# Patient Record
Sex: MALE | Race: WHITE | HISPANIC OR LATINO | Employment: UNEMPLOYED | URBAN - METROPOLITAN AREA
[De-identification: names, ages, dates, MRNs, and addresses within clinical notes are randomized per-mention and may not be internally consistent; named-entity substitution may affect disease eponyms.]

---

## 2018-09-25 ENCOUNTER — HOSPITAL ENCOUNTER (EMERGENCY)
Facility: MEDICAL CENTER | Age: 28
End: 2018-09-25
Attending: EMERGENCY MEDICINE
Payer: COMMERCIAL

## 2018-09-25 VITALS
WEIGHT: 315 LBS | BODY MASS INDEX: 41.75 KG/M2 | DIASTOLIC BLOOD PRESSURE: 80 MMHG | SYSTOLIC BLOOD PRESSURE: 140 MMHG | HEART RATE: 64 BPM | HEIGHT: 73 IN | RESPIRATION RATE: 14 BRPM | OXYGEN SATURATION: 98 % | TEMPERATURE: 98.2 F

## 2018-09-25 VITALS
TEMPERATURE: 97.6 F | HEIGHT: 73 IN | HEART RATE: 61 BPM | DIASTOLIC BLOOD PRESSURE: 79 MMHG | SYSTOLIC BLOOD PRESSURE: 138 MMHG | RESPIRATION RATE: 17 BRPM | BODY MASS INDEX: 41.75 KG/M2 | OXYGEN SATURATION: 95 % | WEIGHT: 315 LBS

## 2018-09-25 DIAGNOSIS — M54.31 SCIATICA OF RIGHT SIDE: ICD-10-CM

## 2018-09-25 DIAGNOSIS — R11.10 NON-INTRACTABLE VOMITING, PRESENCE OF NAUSEA NOT SPECIFIED, UNSPECIFIED VOMITING TYPE: ICD-10-CM

## 2018-09-25 PROCEDURE — 99284 EMERGENCY DEPT VISIT MOD MDM: CPT

## 2018-09-25 PROCEDURE — 96372 THER/PROPH/DIAG INJ SC/IM: CPT

## 2018-09-25 PROCEDURE — 700111 HCHG RX REV CODE 636 W/ 250 OVERRIDE (IP): Performed by: EMERGENCY MEDICINE

## 2018-09-25 RX ORDER — ONDANSETRON 4 MG/1
4 TABLET, ORALLY DISINTEGRATING ORAL ONCE
Status: COMPLETED | OUTPATIENT
Start: 2018-09-25 | End: 2018-09-25

## 2018-09-25 RX ORDER — ESOMEPRAZOLE MAGNESIUM 40 MG/1
40 GRANULE, DELAYED RELEASE ORAL
COMMUNITY
End: 2018-09-26

## 2018-09-25 RX ORDER — CYCLOBENZAPRINE HCL 10 MG
10 TABLET ORAL 3 TIMES DAILY PRN
Qty: 30 TAB | Refills: 0 | Status: SHIPPED | OUTPATIENT
Start: 2018-09-25

## 2018-09-25 RX ORDER — HYDROMORPHONE HYDROCHLORIDE 2 MG/ML
2 INJECTION, SOLUTION INTRAMUSCULAR; INTRAVENOUS; SUBCUTANEOUS ONCE
Status: COMPLETED | OUTPATIENT
Start: 2018-09-25 | End: 2018-09-25

## 2018-09-25 RX ORDER — METHYLPREDNISOLONE 4 MG/1
TABLET ORAL
Qty: 1 KIT | Refills: 0 | Status: SHIPPED | OUTPATIENT
Start: 2018-09-25 | End: 2018-09-30

## 2018-09-25 RX ORDER — KETOROLAC TROMETHAMINE 30 MG/ML
30 INJECTION, SOLUTION INTRAMUSCULAR; INTRAVENOUS ONCE
Status: COMPLETED | OUTPATIENT
Start: 2018-09-25 | End: 2018-09-25

## 2018-09-25 RX ADMIN — ONDANSETRON 4 MG: 4 TABLET, ORALLY DISINTEGRATING ORAL at 16:29

## 2018-09-25 RX ADMIN — ONDANSETRON 4 MG: 4 TABLET, ORALLY DISINTEGRATING ORAL at 17:12

## 2018-09-25 RX ADMIN — KETOROLAC TROMETHAMINE 30 MG: 30 INJECTION, SOLUTION INTRAMUSCULAR; INTRAVENOUS at 14:16

## 2018-09-25 RX ADMIN — HYDROMORPHONE HYDROCHLORIDE 2 MG: 2 INJECTION INTRAMUSCULAR; INTRAVENOUS; SUBCUTANEOUS at 14:16

## 2018-09-25 ASSESSMENT — PAIN SCALES - GENERAL
PAINLEVEL_OUTOF10: 3
PAINLEVEL_OUTOF10: 8
PAINLEVEL_OUTOF10: 8
PAINLEVEL_OUTOF10: 3

## 2018-09-25 ASSESSMENT — PAIN DESCRIPTION - DESCRIPTORS: DESCRIPTORS: SHOOTING

## 2018-09-25 NOTE — ED TRIAGE NOTES
"Zohaib Reich  Chief Complaint   Patient presents with   • Vomiting   • Dizziness     Pt w/c to triage with above complaint. Pt was discharged from ED at approx 1500. Pt states he was given dilaudid and Toradol IM injections prior to discharge. Pt now c/o N/V and feeling dizzy. Pt stating \"I just want this to go away. I can't be out there like this.\"    /87   Pulse 65   Temp 36.4 °C (97.6 °F) (Temporal)   Resp 16   Ht 1.854 m (6' 1\")   Wt (!) 142.9 kg (315 lb 0.6 oz)   SpO2 95%   BMI 41.56 kg/m²     Pt informed of triage process and encouraged to notify staff of any changes or concerns. Pt verbalized understanding of instructions. Pt placed back in lobby.     "

## 2018-09-25 NOTE — ED TRIAGE NOTES
"Zohaib Ernesto Damir  Chief Complaint   Patient presents with   • Low Back Pain     Pt ambulatory to triage with above complaint. Pt denies radiation of pain. Pt denies any trauma or falls. Pt denies difficulty or painful urination.    /94   Pulse 68   Temp 36.7 °C (98 °F)   Resp 12   Ht 1.854 m (6' 1\")   Wt (!) 143.9 kg (317 lb 3.9 oz)   SpO2 98%   BMI 41.86 kg/m²     Pt informed of triage process and encouraged to notify staff of any changes or concerns. Pt verbalized understanding of instructions. Pt placed back in lobby.     "

## 2018-09-25 NOTE — ED NOTES
Discharge instructions and prescriptions provided. Pt verbalized understanding. Pt vital signs stable.  Questions and concerns addressed. Patient instructed to not drive or operate vehicles. Patient ambulated steadily out of department without difficulty in care of family

## 2018-09-25 NOTE — ED PROVIDER NOTES
ED Provider Note    CHIEF COMPLAINT  Chief Complaint   Patient presents with   • Vomiting   • Dizziness       HPI  Zohaib Reich is a 27 y.o. male who presents for evaluation after recent visit at this facility today. I saw the patient earlier today. Symptoms consistent with sciatica. He is given intramuscular Dilaudid Toradol and sent home on a Medrol Dosepak and Flexeril. He was doing quite fine after an observation period of about an hour we wheeled him out to check out then he became quite nauseous. We brought him back in. He did not report any new symptoms other than nausea queasiness and dizziness. No chest pain or shortness of breath    REVIEW OF SYSTEMS  See HPI for further details.  No reported Fevers chills night sweats weight loss All other systems are negative.     PAST MEDICAL HISTORY  Past Medical History:   Diagnosis Date   • GERD (gastroesophageal reflux disease)        FAMILY HISTORY  Noncontributory    SOCIAL HISTORY  Social History     Social History   • Marital status: Single     Spouse name: N/A   • Number of children: N/A   • Years of education: N/A     Social History Main Topics   • Smoking status: Never Smoker   • Smokeless tobacco: Never Used   • Alcohol use No   • Drug use: Yes     Types: Inhaled      Comment: marijuana   • Sexual activity: Not on file     Other Topics Concern   • Not on file     Social History Narrative   • No narrative on file       SURGICAL HISTORY  No past surgical history on file.    CURRENT MEDICATIONS  Home Medications     Reviewed by Isha Yen R.N. (Registered Nurse) on 09/25/18 at 1603  Med List Status: Complete   Medication Last Dose Status   cyclobenzaprine (FLEXERIL) 10 MG Tab  Active   esomeprazole magnesium (NEXIUM) 40 MG Pack 9/3/2018 Active   MethylPREDNISolone (MEDROL DOSEPAK) 4 MG Tablet Therapy Pack  Active                ALLERGIES  No Known Allergies    PHYSICAL EXAM  VITAL SIGNS: /87   Pulse 65   Temp 36.4 °C (97.6 °F) (Temporal)  "  Resp 16   Ht 1.854 m (6' 1\")   Wt (!) 142.9 kg (315 lb 0.6 oz)   SpO2 95%   BMI 41.56 kg/m²       Constitutional:Patient appears nauseous uncomfortable actively vomiting HENT: Normocephalic, Atraumatic, Bilateral external ears normal, Oropharynx moist, No oral exudates, Nose normal.   Eyes: PERRLA, EOMI, Conjunctiva normal, No discharge.   Neck: Normal range of motion, No tenderness, Supple, No stridor.   Cardiovascular: Normal heart rate, Normal rhythm, No murmurs, No rubs, No gallops.   Thorax & Lungs: Normal breath sounds, No respiratory distress, No wheezing, No chest tenderness.   Abdomen: Bowel sounds normal, Soft, No tenderness, No masses, No pulsatile masses.   Skin: Warm, Dry, No erythema, No rash.   Back: No tenderness, No CVA tenderness.   Extremities: Intact distal pulses, No edema, No tenderness, No cyanosis, No clubbing.   Neurologic: Alert & oriented x 3, Normal motor function, Normal sensory function, No focal deficits noted.       COURSE & MEDICAL DECISION MAKING  Pertinent Labs & Imaging studies reviewed. (See chart for details)  I suspect the patient has opioid-induced nausea and vomiting. Breath patient back if him a sublingual Zofran patient was observed for several hours of acute liquids down. He feels much better after 2nd dose of Zofran    FINAL IMPRESSION  1.   1. Non-intractable vomiting, presence of nausea not specified, unspecified vomiting type             Electronically signed by: Jag Jefferson, 9/25/2018 4:39 PM    "

## 2018-09-25 NOTE — ED PROVIDER NOTES
"ED Provider Note    CHIEF COMPLAINT  Chief Complaint   Patient presents with   • Low Back Pain       HPI  Zohaib Reich is a 27 y.o. male who presents For evaluation of several days of worsening low back pain and right sided with radiation to the buttock but not down the leg. He denies any distinct trauma specifically no blunt or penetrating trauma. He cannot distinctly recall any injury pattern. She denies any IV drug use no incontinence no numbness weakness or tingling. Patient reports worsening pain with leg extension no incontinence. He denies IV drug use. No history of back surgery    REVIEW OF SYSTEMS  See HPI for further details. No incontinence numbness tingling weakness flank pain or hematuria All other systems are negative.     PAST MEDICAL HISTORY  Past Medical History:   Diagnosis Date   • GERD (gastroesophageal reflux disease)        FAMILY HISTORY  Noncontributory    SOCIAL HISTORY  Social History     Social History   • Marital status: N/A     Spouse name: N/A   • Number of children: N/A   • Years of education: N/A     Social History Main Topics   • Smoking status: Never Smoker   • Smokeless tobacco: Never Used   • Alcohol use No   • Drug use: Yes     Types: Inhaled      Comment: marijuana   • Sexual activity: Not on file     Other Topics Concern   • Not on file     Social History Narrative   • No narrative on file   No IV drugs    SURGICAL HISTORY  No past surgical history on file.    CURRENT MEDICATIONS  Home Medications     Reviewed by Isha Yen R.N. (Registered Nurse) on 09/25/18 at 1337  Med List Status: Partial   Medication Last Dose Status   esomeprazole magnesium (NEXIUM) 40 MG Pack 9/3/2018 Active                ALLERGIES  No Known Allergies    PHYSICAL EXAM  VITAL SIGNS: /94   Pulse 68   Temp 36.7 °C (98 °F)   Resp 12   Ht 1.854 m (6' 1\")   Wt (!) 143.9 kg (317 lb 3.9 oz)   SpO2 98%   BMI 41.86 kg/m²  Room air O2: 98    Constitutional:Patient appears to be " uncomfortable   HENT: Normocephalic, Atraumatic, Bilateral external ears normal, Oropharynx moist, No oral exudates, Nose normal.   Eyes: PERRLA, EOMI, Conjunctiva normal, No discharge.   Neck: Normal range of motion, No tenderness, Supple, No stridor.   Cardiovascular: Normal heart rate, Normal rhythm, No murmurs, No rubs, No gallops.   Thorax & Lungs: Normal breath sounds, No respiratory distress, No wheezing, No chest tenderness.   Abdomen: Bowel sounds normal, Soft, No tenderness, No masses, No pulsatile masses.   Skin: Warm, Dry, No erythema, No rash.   Back: No midline bony thoracolumbar tenderness. Significant paraspinal spasm on the right side discomfort noted over the sciatic foramen on the buttock. Positive straight leg test on the right. Current sensation is normal  Genitalia: Groin sensation is normal   Extremities: Intact distal pulses, No edema, No tenderness, No cyanosis, No clubbing.   Neurologic: Alert & oriented x 3, Normal motor function, Normal sensory function, No focal deficits noted. Bilateral patellar DTRs intact positive straight leg test on the right  Psychiatric: Anxious         COURSE & MEDICAL DECISION MAKING  Pertinent Labs & Imaging studies reviewed. (See chart for details)  The patient presented with classic history and physical exam to suggest right-sided sciatica. He has no hard neurological deficits no fever no history of trauma prior back surgery or history of IV drug use. The patient was given intramuscular Toradol and Dilaudid. I'll discharge him home on a Medrol Dosepak and Flexeril. I counseled him to avoid heavy lifting or any other issues or movements that will exacerbate his symptoms    FINAL IMPRESSION  1. Acute right-sided sciatica      Electronically signed by: Jag Jefferson, 9/25/2018 1:58 PM

## 2018-09-26 ENCOUNTER — HOSPITAL ENCOUNTER (EMERGENCY)
Facility: MEDICAL CENTER | Age: 28
End: 2018-09-26
Attending: EMERGENCY MEDICINE
Payer: COMMERCIAL

## 2018-09-26 VITALS
BODY MASS INDEX: 41.71 KG/M2 | RESPIRATION RATE: 16 BRPM | OXYGEN SATURATION: 95 % | WEIGHT: 315 LBS | DIASTOLIC BLOOD PRESSURE: 86 MMHG | SYSTOLIC BLOOD PRESSURE: 129 MMHG | TEMPERATURE: 97.5 F | HEART RATE: 60 BPM

## 2018-09-26 DIAGNOSIS — M54.31 SCIATICA OF RIGHT SIDE: ICD-10-CM

## 2018-09-26 DIAGNOSIS — Z76.0 MEDICATION REFILL: ICD-10-CM

## 2018-09-26 LAB
APPEARANCE UR: CLEAR
BILIRUB UR QL STRIP.AUTO: NEGATIVE
COLOR UR: YELLOW
GLUCOSE UR STRIP.AUTO-MCNC: NEGATIVE MG/DL
KETONES UR STRIP.AUTO-MCNC: NEGATIVE MG/DL
LEUKOCYTE ESTERASE UR QL STRIP.AUTO: NEGATIVE
MICRO URNS: NORMAL
NITRITE UR QL STRIP.AUTO: NEGATIVE
PH UR STRIP.AUTO: 6 [PH]
PROT UR QL STRIP: NEGATIVE MG/DL
RBC UR QL AUTO: NEGATIVE
SP GR UR STRIP.AUTO: 1.01
UROBILINOGEN UR STRIP.AUTO-MCNC: 0.2 MG/DL

## 2018-09-26 PROCEDURE — 700111 HCHG RX REV CODE 636 W/ 250 OVERRIDE (IP): Performed by: EMERGENCY MEDICINE

## 2018-09-26 PROCEDURE — 700102 HCHG RX REV CODE 250 W/ 637 OVERRIDE(OP): Performed by: EMERGENCY MEDICINE

## 2018-09-26 PROCEDURE — 99284 EMERGENCY DEPT VISIT MOD MDM: CPT

## 2018-09-26 PROCEDURE — 96372 THER/PROPH/DIAG INJ SC/IM: CPT

## 2018-09-26 PROCEDURE — 81003 URINALYSIS AUTO W/O SCOPE: CPT

## 2018-09-26 PROCEDURE — A9270 NON-COVERED ITEM OR SERVICE: HCPCS | Performed by: EMERGENCY MEDICINE

## 2018-09-26 RX ORDER — ESOMEPRAZOLE MAGNESIUM 40 MG/1
40 GRANULE, DELAYED RELEASE ORAL
Qty: 30 EACH | Refills: 0 | Status: SHIPPED | OUTPATIENT
Start: 2018-09-26 | End: 2018-09-30

## 2018-09-26 RX ORDER — ONDANSETRON 4 MG/1
4 TABLET, ORALLY DISINTEGRATING ORAL ONCE
Status: COMPLETED | OUTPATIENT
Start: 2018-09-26 | End: 2018-09-26

## 2018-09-26 RX ORDER — OXYCODONE HYDROCHLORIDE AND ACETAMINOPHEN 5; 325 MG/1; MG/1
2 TABLET ORAL ONCE
Status: COMPLETED | OUTPATIENT
Start: 2018-09-26 | End: 2018-09-26

## 2018-09-26 RX ORDER — KETOROLAC TROMETHAMINE 30 MG/ML
30 INJECTION, SOLUTION INTRAMUSCULAR; INTRAVENOUS ONCE
Status: COMPLETED | OUTPATIENT
Start: 2018-09-26 | End: 2018-09-26

## 2018-09-26 RX ORDER — OXYCODONE HYDROCHLORIDE AND ACETAMINOPHEN 5; 325 MG/1; MG/1
1-2 TABLET ORAL EVERY 6 HOURS PRN
Qty: 12 TAB | Refills: 0 | Status: SHIPPED | OUTPATIENT
Start: 2018-09-26 | End: 2018-09-29

## 2018-09-26 RX ADMIN — ONDANSETRON 4 MG: 4 TABLET, ORALLY DISINTEGRATING ORAL at 12:51

## 2018-09-26 RX ADMIN — KETOROLAC TROMETHAMINE 30 MG: 30 INJECTION, SOLUTION INTRAMUSCULAR at 12:54

## 2018-09-26 RX ADMIN — OXYCODONE HYDROCHLORIDE AND ACETAMINOPHEN 2 TABLET: 5; 325 TABLET ORAL at 12:51

## 2018-09-26 ASSESSMENT — PAIN SCALES - GENERAL: PAINLEVEL_OUTOF10: 9

## 2018-09-26 NOTE — ED TRIAGE NOTES
PT to triage c/o low back pain x 2 days.  PT was seen yesterday for the same.  Chief Complaint   Patient presents with   • Low Back Pain     Blood pressure 135/81, pulse 67, temperature 36.4 °C (97.5 °F), resp. rate 16, weight (!) 143.4 kg (316 lb 2.2 oz), SpO2 94 %.

## 2018-09-26 NOTE — ED NOTES
"Patient walked back into [facility], asking for food.  Patient stated he went to the cafeteria and states that \"we would provide him with a voucher for food.\"  Patient requesting voucher or box lunch.  ERP and this RN informed that patient that we only have crackers in this pod.  Patient complaining that he saw other people with boxed lunches and states that he does not care but he is hungry.  Patient left [facility] again.    "

## 2018-09-26 NOTE — DISCHARGE INSTRUCTIONS
Continue the medications were prescribed.  Return to the emergency part for more pain or any new symptoms or concerns.  Follow-up with [facility] doctor or the [facility].

## 2018-09-26 NOTE — ED PROVIDER NOTES
ED Provider Note  CHIEF COMPLAINT  Chief Complaint   Patient presents with   • Low Back Pain       HPI  Zohaib Reich is a 27 y.o. male who presents to the emergency department complaining of low back pain.  The patient has low back pain on the right that his back has had this for the last several days.  He has a long history of back pain, he states the more chronic upper thoracic back pain as a complication of scoliosis.  This is been present for several years.  Recently has become homeless and has been carrying a backpack around and he feels this is causing of some back pain.  The pain is in the lower right side of his back.  Radiates down his leg and up his thigh.  No nausea no vomiting no abdominal pain no hematuria.    He has not sustained any falls or direct trauma.  He has no incontinence of bowel or bladder.  No saddle anesthesia.  Denies any numbness tingling or weakness in the legs other than difficulty standing up because of pain.  Denies any fevers chills, recent febrile illness, or injection drug abuse.  Was recently for back pain.  Was prescribed Medrol Dosepak and Flexeril.  Has little relief.  Denies any other acute concerns or complaints.    REVIEW OF SYSTEMS  See HPI for further details. All other systems are negative.    PAST MEDICAL HISTORY  Past Medical History:   Diagnosis Date   • GERD (gastroesophageal reflux disease)        FAMILY HISTORY  History reviewed. No pertinent family history.    SOCIAL HISTORY  Social History     Social History   • Marital status: Single     Spouse name: N/A   • Number of children: N/A   • Years of education: N/A     Social History Main Topics   • Smoking status: Never Smoker   • Smokeless tobacco: Never Used   • Alcohol use No   • Drug use: Yes     Types: Inhaled      Comment: marijuana   • Sexual activity: Not on file     Other Topics Concern   • Not on file     Social History Narrative   • No narrative on file       SURGICAL HISTORY  History reviewed. No  pertinent surgical history.    CURRENT MEDICATIONS  Home Medications     Reviewed by Erica Michaud R.N. (Registered Nurse) on 09/26/18 at 1215  Med List Status: Complete   Medication Last Dose Status   cyclobenzaprine (FLEXERIL) 10 MG Tab 9/26/2018 Active   esomeprazole magnesium (NEXIUM) 40 MG Pack 9/3/2018 Active   MethylPREDNISolone (MEDROL DOSEPAK) 4 MG Tablet Therapy Pack 9/26/2018 Active                ALLERGIES  No Known Allergies    PHYSICAL EXAM  VITAL SIGNS: /81   Pulse 67   Temp 36.4 °C (97.5 °F)   Resp 16   Wt (!) 143.4 kg (316 lb 2.2 oz)   SpO2 94%   BMI 41.71 kg/m²    Constitutional: Well developed, Well nourished, No acute distress, Non-toxic appearance.   HENT: Normocephalic, Atraumatic,  Eyes: PERRL, EOMI, Conjunctiva normal, No discharge.   Neck: Normal range of motion, No tenderness, Supple, No stridor.   Lymphatic: No lymphadenopathy noted.   Cardiovascular: Normal heart rate, Normal rhythm, No murmurs, No rubs, No gallops.   Thorax & Lungs: Normal breath sounds, No respiratory distress, No wheezing,  Abdomen: Bowel sounds normal, Soft, No tenderness,  Skin: Warm, Dry, No erythema, No rash.   Back: No midline thoracic or lumbar tenderness.  He has paraspinal muscle spasm and tenderness in the right side this is palpable.  He has tenderness palpation of muscles on the right side per  Musculoskeletal: Good range of motion in all major joints. No tenderness to palpation or major deformities noted.  Pulses in both feet are strong and symmetric per   neurologic: Alert & oriented x 3, Normal motor function, Normal sensory function, No focal deficits noted.  Measure DTR at the ankle and patella bilaterally.  Normal great toe raise dorsiflexion plantarflexion of the feet.  Normal sensation  Psychiatric: Affect normal            COURSE & MEDICAL DECISION MAKING  Pertinent Labs & Imaging studies reviewed. (See chart for details)  The patient presents to the emergency department with low  back pain.    Broad differential diagnosis was considered.  He was recently here, the chart from that visit was reviewed.  No red flags to suggest significant pathology.    At this point the patient presents with low back pain. The patient's physical exam is essentially benign. There is no evidence of cord impingement. No evidence of cauda equina. Clinically and historically there is no concern for epidural abscess, or epidural hematoma. There is no history of recent trauma. Patient has had these symptoms previously. At this time I feel that the most appropriate treatment for what is apparently mechanical low back pain with his pain control. I will prescribe Percocet for pain.        In prescribing controlled substances to this patient, I certify that I have obtained and reviewed the medical history of Zohaib Reich. I have also made a good abdulaziz effort to obtain applicable records from other providers who have treated the patient and records did not demonstrate any increased risk of substance abuse that would prevent me from prescribing controlled substances.     I have conducted a physical exam and documented it. I have reviewed Mr. Reich’s prescription history as maintained by the [place] Prescription Monitoring Program.     I have assessed the patient’s risk for abuse, dependency, and addiction using the validated Opioid Risk Tool available at https://www.mdcalc.com/rtozac-zmvj-uiht-ort-narcotic-abuse.     Given the above, I believe the benefits of controlled substance therapy outweigh the risks. The reasons for prescribing controlled substances include non-narcotic, oral analgesic alternatives have been inadequate for pain control. Accordingly, I have discussed the risk and benefits, treatment plan, and alternative therapies with the patient      The patient felt much better after some pain medicine.  Is a bit around the ER asking for food.  He looks well he is a normal neurologic exam there is no red flags  to suggest he requires imaging or workup at this time.  He is given referral for outpatient workup.  His blood pressure be rechecked.  He is prescribed Percocet to add to his additional regimen.  He is given appropriate return precautions questions are answered is agreeable with the plan.    Subsequent the patient is looking for a refill of his Nexium.  I gave a prescription for this.        The patient was noted to have elevated blood pressure while in the ER and was counseled to see their doctor within one wee to have this rechecked.    FINAL IMPRESSION  1. Sciatica of right side Active   2. Medication refill        2.   3.         Electronically signed by: Jag Zafar, 9/26/2018 12:56 PM

## 2018-09-27 ENCOUNTER — HOSPITAL ENCOUNTER (EMERGENCY)
Facility: MEDICAL CENTER | Age: 28
End: 2018-09-27
Attending: EMERGENCY MEDICINE
Payer: COMMERCIAL

## 2018-09-27 VITALS
WEIGHT: 315 LBS | HEART RATE: 72 BPM | HEIGHT: 73 IN | RESPIRATION RATE: 16 BRPM | TEMPERATURE: 97.5 F | BODY MASS INDEX: 41.75 KG/M2 | OXYGEN SATURATION: 94 % | SYSTOLIC BLOOD PRESSURE: 158 MMHG | DIASTOLIC BLOOD PRESSURE: 86 MMHG

## 2018-09-27 DIAGNOSIS — M54.31 SCIATICA OF RIGHT SIDE: ICD-10-CM

## 2018-09-27 DIAGNOSIS — S39.012D STRAIN OF LUMBAR REGION, SUBSEQUENT ENCOUNTER: ICD-10-CM

## 2018-09-27 PROCEDURE — 99284 EMERGENCY DEPT VISIT MOD MDM: CPT

## 2018-09-27 PROCEDURE — 700111 HCHG RX REV CODE 636 W/ 250 OVERRIDE (IP): Performed by: EMERGENCY MEDICINE

## 2018-09-27 PROCEDURE — 96372 THER/PROPH/DIAG INJ SC/IM: CPT

## 2018-09-27 RX ORDER — KETOROLAC TROMETHAMINE 30 MG/ML
30 INJECTION, SOLUTION INTRAMUSCULAR; INTRAVENOUS ONCE
Status: COMPLETED | OUTPATIENT
Start: 2018-09-27 | End: 2018-09-27

## 2018-09-27 RX ORDER — KETOROLAC TROMETHAMINE 10 MG/1
10 TABLET, FILM COATED ORAL EVERY 4 HOURS PRN
Qty: 30 TAB | Refills: 0 | Status: SHIPPED | OUTPATIENT
Start: 2018-09-27

## 2018-09-27 RX ADMIN — KETOROLAC TROMETHAMINE 30 MG: 30 INJECTION, SOLUTION INTRAMUSCULAR at 14:50

## 2018-09-27 ASSESSMENT — PAIN SCALES - GENERAL: PAINLEVEL_OUTOF10: 8

## 2018-09-27 NOTE — ED TRIAGE NOTES
28 y/o male ambulatory to triage with c/o right sided low back pain. Pt states this began three days ago, this is his third visit for the same thing, yesterday he was prescribed percocet and flexeril which are not helping his pain. Pt states he is homeless and does not have anywhere to rest, he also does not have fare for a bus and has been walking a lot.

## 2018-09-27 NOTE — ED PROVIDER NOTES
ED Provider Note    CHIEF COMPLAINT  Chief Complaint   Patient presents with   • Low Back Pain     right side low back       HPI  Zohaib Reich is a 27 y.o. male who presents complaining of continued right-sided low back pain.  This is the patient's fourth visit in 4 days for the same problem.  He reports pain in his lower back rating down his right butt cheek.  He denies any numbness in the groin weakness in the legs or loss of bowel or bladder control.  He is currently taking Percocet, Aleve, Flexeril and steroid taper.  He states he has been taking his medications as directed and still has no relief.  He states he did receive relief from the Toradol that he received yesterday as a shot.  He denies any recent trauma or injury.  He denies any history of back surgeries.  He states that he could not get in with an appointment to the [facility] until mid October so he did not make the appointment.  The patient states that he is currently homeless along with his father and he has been ambulating quite a bit.  He denies any heavy lifting.    REVIEW OF SYSTEMS  See HPI for further details.  Positive right-sided low back pain no numbness no weakness no loss of bowel or bladder control no fevers or chills    PAST MEDICAL HISTORY  Past Medical History:   Diagnosis Date   • GERD (gastroesophageal reflux disease)        FAMILY HISTORY  No family history on file.    SOCIAL HISTORY  Social History     Social History   • Marital status: Single     Spouse name: N/A   • Number of children: N/A   • Years of education: N/A     Social History Main Topics   • Smoking status: Never Smoker   • Smokeless tobacco: Never Used   • Alcohol use No   • Drug use: Yes     Types: Inhaled      Comment: marijuana   • Sexual activity: Not on file     Other Topics Concern   • Not on file     Social History Narrative   • No narrative on file       SURGICAL HISTORY  No past surgical history on file.    CURRENT MEDICATIONS  Home  "Medications     Reviewed by Jc Espinoza R.N. (Registered Nurse) on 09/27/18 at 1355  Med List Status: Complete   Medication Last Dose Status   cyclobenzaprine (FLEXERIL) 10 MG Tab 9/27/2018 Active   esomeprazole magnesium (NEXIUM) 40 MG Pack 9/27/2018 Active   MethylPREDNISolone (MEDROL DOSEPAK) 4 MG Tablet Therapy Pack 9/27/2018 Active   oxyCODONE-acetaminophen (PERCOCET) 5-325 MG Tab 9/27/2018 Active                ALLERGIES  No Known Allergies    PHYSICAL EXAM  VITAL SIGNS: /85   Pulse (!) 59   Temp 36.4 °C (97.5 °F) (Temporal)   Resp 16   Ht 1.854 m (6' 1\")   Wt (!) 143.6 kg (316 lb 9.3 oz)   SpO2 97%   BMI 41.77 kg/m²        Constitutional: Well developed, Well nourished, No acute distress, Non-toxic appearance.   Neck: Normal range of motion, No tenderness, Supple, No stridor.   Cardiovascular: Normal heart rate, Normal rhythm, No murmurs, No rubs, No gallops. No pulsatile masses.  Thorax & Lungs: Normal breath sounds, No respiratory distress, No wheezing, No chest tenderness.   Abdomen: Bowel sounds normal, Soft, No tenderness, No masses, No pulsatile masses.   Skin: Warm, Dry, No erythema, No rash.   Back: Positive tenderness in the right SI joint area and lower back and paraspinous musculature without bony step-offs crepitance or tenderness.  Extremities: Intact distal pulses, No edema, No tenderness, No cyanosis, No clubbing.   Neurologic: Alert & oriented x 3, Normal motor function, Normal sensory function, No focal deficits noted.  Patient has normal DTRs normal strength and sensation of the lower extremities and no clonus      COURSE & MEDICAL DECISION MAKING  Pertinent Labs & Imaging studies reviewed. (See chart for details)  The patient appears to have continued sciatica on his physical exam without any weakness.  I advised him to continue his medications and we will give him a shot of IM Toradol here.  I will discharge him home on Toradol and advised him not to take the Aleve if he " is taking the Toradol orally for pain.  I also prescribed him a TENS unit to try and help with the muscle pain and spasm.  I informed the patient that he can walk-in to the [facility] for an appointment same day.  Advised him to get there early in the morning so he can be seen.  He is to return to the emergency department for numbness in the groin weakness in the legs or loss of bowel or bladder control.    [address] [address] [phone]  In 1 day  to establish care    Current Outpatient Prescriptions   Medication Sig Dispense Refill   • ketorolac (TORADOL) 10 MG Tab Take 1 Tab by mouth every four hours as needed. 30 Tab 0   • oxyCODONE-acetaminophen (PERCOCET) 5-325 MG Tab Take 1-2 Tabs by mouth every 6 hours as needed (pain) for up to 3 days. 12 Tab 0   • esomeprazole magnesium (NEXIUM) 40 MG Pack Take 40 mg by mouth every morning before breakfast. 30 Each 0   • MethylPREDNISolone (MEDROL DOSEPAK) 4 MG Tablet Therapy Pack Use as directed 1 Kit 0   • cyclobenzaprine (FLEXERIL) 10 MG Tab Take 1 Tab by mouth 3 times a day as needed for Moderate Pain. 30 Tab 0         FINAL IMPRESSION  1. Strain of lumbar region, subsequent encounter Chronic   2. Sciatica of right side Chronic           Electronically signed by: Myriam Rojas, 9/27/2018 2:26 PM

## 2018-09-27 NOTE — ED NOTES
Pt ambulated to [facility], agreed triage note. Pt said that he also tried to [facility] but is not [facility]. Called scheduling , advised pt to call as new patient.

## 2018-09-27 NOTE — ED NOTES
D/C home with written and verbal instructions re: Rx, activity, f/u.  Verbalizes understanding.  Ambulated out with steady gait to [facility]

## 2018-09-30 ENCOUNTER — HOSPITAL ENCOUNTER (EMERGENCY)
Facility: MEDICAL CENTER | Age: 28
End: 2018-09-30
Attending: EMERGENCY MEDICINE
Payer: COMMERCIAL

## 2018-09-30 VITALS
RESPIRATION RATE: 16 BRPM | BODY MASS INDEX: 41.75 KG/M2 | TEMPERATURE: 98.2 F | DIASTOLIC BLOOD PRESSURE: 89 MMHG | OXYGEN SATURATION: 94 % | SYSTOLIC BLOOD PRESSURE: 144 MMHG | HEIGHT: 73 IN | HEART RATE: 52 BPM | WEIGHT: 315 LBS

## 2018-09-30 DIAGNOSIS — G89.29 CHRONIC MIDLINE LOW BACK PAIN WITH RIGHT-SIDED SCIATICA: ICD-10-CM

## 2018-09-30 DIAGNOSIS — M54.41 CHRONIC MIDLINE LOW BACK PAIN WITH RIGHT-SIDED SCIATICA: ICD-10-CM

## 2018-09-30 DIAGNOSIS — K62.5 RECTAL BLEED: ICD-10-CM

## 2018-09-30 LAB
ALBUMIN SERPL BCP-MCNC: 4 G/DL (ref 3.2–4.9)
ALBUMIN/GLOB SERPL: 1.3 G/DL
ALP SERPL-CCNC: 45 U/L (ref 30–99)
ALT SERPL-CCNC: 83 U/L (ref 2–50)
ANION GAP SERPL CALC-SCNC: 7 MMOL/L (ref 0–11.9)
APTT PPP: 27.9 SEC (ref 24.7–36)
AST SERPL-CCNC: 46 U/L (ref 12–45)
BASOPHILS # BLD AUTO: 1 % (ref 0–1.8)
BASOPHILS # BLD: 0.06 K/UL (ref 0–0.12)
BILIRUB SERPL-MCNC: 0.4 MG/DL (ref 0.1–1.5)
BUN SERPL-MCNC: 17 MG/DL (ref 8–22)
CALCIUM SERPL-MCNC: 9.5 MG/DL (ref 8.5–10.5)
CHLORIDE SERPL-SCNC: 107 MMOL/L (ref 96–112)
CO2 SERPL-SCNC: 22 MMOL/L (ref 20–33)
CREAT SERPL-MCNC: 0.96 MG/DL (ref 0.5–1.4)
EOSINOPHIL # BLD AUTO: 0.24 K/UL (ref 0–0.51)
EOSINOPHIL NFR BLD: 4 % (ref 0–6.9)
ERYTHROCYTE [DISTWIDTH] IN BLOOD BY AUTOMATED COUNT: 42.5 FL (ref 35.9–50)
GLOBULIN SER CALC-MCNC: 3 G/DL (ref 1.9–3.5)
GLUCOSE SERPL-MCNC: 85 MG/DL (ref 65–99)
HCT VFR BLD AUTO: 42.1 % (ref 42–52)
HGB BLD-MCNC: 14.2 G/DL (ref 14–18)
IMM GRANULOCYTES # BLD AUTO: 0.01 K/UL (ref 0–0.11)
IMM GRANULOCYTES NFR BLD AUTO: 0.2 % (ref 0–0.9)
INR PPP: 1.05 (ref 0.87–1.13)
LYMPHOCYTES # BLD AUTO: 2.02 K/UL (ref 1–4.8)
LYMPHOCYTES NFR BLD: 33.9 % (ref 22–41)
MCH RBC QN AUTO: 30.7 PG (ref 27–33)
MCHC RBC AUTO-ENTMCNC: 33.7 G/DL (ref 33.7–35.3)
MCV RBC AUTO: 90.9 FL (ref 81.4–97.8)
MONOCYTES # BLD AUTO: 0.78 K/UL (ref 0–0.85)
MONOCYTES NFR BLD AUTO: 13.1 % (ref 0–13.4)
NEUTROPHILS # BLD AUTO: 2.85 K/UL (ref 1.82–7.42)
NEUTROPHILS NFR BLD: 47.8 % (ref 44–72)
NRBC # BLD AUTO: 0 K/UL
NRBC BLD-RTO: 0 /100 WBC
PLATELET # BLD AUTO: 265 K/UL (ref 164–446)
PMV BLD AUTO: 9.3 FL (ref 9–12.9)
POTASSIUM SERPL-SCNC: 4 MMOL/L (ref 3.6–5.5)
PROT SERPL-MCNC: 7 G/DL (ref 6–8.2)
PROTHROMBIN TIME: 13.9 SEC (ref 12–14.6)
RBC # BLD AUTO: 4.63 M/UL (ref 4.7–6.1)
SODIUM SERPL-SCNC: 136 MMOL/L (ref 135–145)
WBC # BLD AUTO: 6 K/UL (ref 4.8–10.8)

## 2018-09-30 PROCEDURE — 85025 COMPLETE CBC W/AUTO DIFF WBC: CPT

## 2018-09-30 PROCEDURE — 85730 THROMBOPLASTIN TIME PARTIAL: CPT

## 2018-09-30 PROCEDURE — 85610 PROTHROMBIN TIME: CPT

## 2018-09-30 PROCEDURE — 99283 EMERGENCY DEPT VISIT LOW MDM: CPT

## 2018-09-30 PROCEDURE — 80053 COMPREHEN METABOLIC PANEL: CPT

## 2018-09-30 RX ORDER — LANSOPRAZOLE 30 MG/1
30 CAPSULE, DELAYED RELEASE ORAL DAILY
COMMUNITY

## 2018-09-30 RX ORDER — CARISOPRODOL 350 MG/1
350 TABLET ORAL 4 TIMES DAILY
Qty: 20 TAB | Refills: 0 | Status: SHIPPED | OUTPATIENT
Start: 2018-09-30 | End: 2018-10-05

## 2018-09-30 ASSESSMENT — PAIN SCALES - GENERAL: PAINLEVEL_OUTOF10: 8

## 2018-09-30 NOTE — ED TRIAGE NOTES
"  Chief Complaint   Patient presents with   • Back Pain     Lower R back pain. Dull pain. *one week   • Lower GI Bleed     \"I have blood in my stool for the past three days.\" Taking Toradol. +Constipation.     Ambulatory to triage for above.  Seen here for same*3 times, dx with sciatica.  Denies trauma.    /89   Pulse 68   Temp 36.8 °C (98.2 °F)   Resp 16   Ht 1.854 m (6' 1\")   Wt (!) 143.7 kg (316 lb 12.8 oz)   SpO2 95%   BMI 41.80 kg/m²     "

## 2018-09-30 NOTE — ED NOTES
Pt provided with discharge instructions, prescriptions x1, instructions for follow up appointment with GI and PCP, s/s of when to seek emergency care.  Pt verbalizes understanding.  Pt discharged in good condition.

## 2018-09-30 NOTE — ED NOTES
Pt reports right low back pain radiating down his right leg.  Pt was seen here several times for the same this week but he states the radiating pain into the right leg is new.  Pt additionally reports some bright red blood in stool and had some constipation this week.

## 2018-09-30 NOTE — ED PROVIDER NOTES
"ED Provider Note    CHIEF COMPLAINT  Chief Complaint   Patient presents with   • Back Pain     Lower R back pain. Dull pain. *one week   • Lower GI Bleed     \"I have blood in my stool for the past three days.\" Taking Toradol. +Constipation.       HPI  Zohaib Reich is a 27 y.o. male who presents complaining of blood in his stool.  Onset 4 days ago, bright red blood.  Bleeding was more consistent today.  Patient seen here 6 days ago for back pain, he believes he became constipated \"after those guys gave me Percocet\".  He is also been taking Toradol.  No abdominal pain, no melena.  Patient states he has had back pain since his teens, recently pain moved to the right lower back with sciatica-like symptoms.  For this reason had been taking muscle relaxer, Medrol and the above-noted oxycodone prescription.  No new acute disease.  Patient states \"can I get something for my pain\".  No vomiting.  No nausea.  No fever.  He denies problems with GI bleed in the past.  Patient is currently living in a shelter, has moved here from [place], no primary doctor.  He attempted to establish care at the [facility] several days ago.  Patient denies dizziness or shortness of breath although states he feels \"weird\" since bleeding started    REVIEW OF SYSTEMS  Constitutional: No fever  Respiratory: No shortness of breath  Cardiac: No chest pain or syncope  Gastrointestinal: Bloody stool  Musculoskeletal: Chronic back pain  Neurologic: No headache  Genitourinary: No dysuria       All other systems are negative.     PAST MEDICAL HISTORY  Past Medical History:   Diagnosis Date   • GERD (gastroesophageal reflux disease)        FAMILY HISTORY  History reviewed. No pertinent family history.    SOCIAL HISTORY  Social History     Social History   • Marital status: Single     Spouse name: N/A   • Number of children: N/A   • Years of education: N/A     Social History Main Topics   • Smoking status: Never Smoker   • Smokeless " "tobacco: Never Used   • Alcohol use Yes      Comment: \"couple beers a day if I can afford it\"    • Drug use: Yes     Types: Inhaled      Comment: marijuana   • Sexual activity: Not on file     Other Topics Concern   • Not on file     Social History Narrative   • No narrative on file       SURGICAL HISTORY  History reviewed. No pertinent surgical history.    CURRENT MEDICATIONS  Home Medications     Reviewed by Deyanira Minor R.N. (Registered Nurse) on 09/30/18 at 0828  Med List Status: Complete   Medication Last Dose Status   cyclobenzaprine (FLEXERIL) 10 MG Tab out of Active   ketorolac (TORADOL) 10 MG Tab 9/29/2018 Active   lansoprazole (PREVACID) 30 MG CAPSULE DELAYED RELEASE 9/29/2018 Active                ALLERGIES  No Known Allergies    PHYSICAL EXAM  VITAL SIGNS: /89   Pulse 68   Temp 36.8 °C (98.2 °F)   Resp 16   Ht 1.854 m (6' 1\")   Wt (!) 143.7 kg (316 lb 12.8 oz)   SpO2 95%   BMI 41.80 kg/m²   Constitutional: Well-nourished, nontoxic appearance  ENT: Nares clear, mucous membranes moist.  No epistaxis  Eyes:  Conjunctiva normal, No discharge.    Lymphatic: No adenopathy.   Cardiovascular: Normal heart rate, Normal rhythm.   Pulmonary: No wheezing, no rales  Gastrointestinal: Abdomen is soft and nontender.  No external anal lesions or external hemorrhoids.  Stool is brown however stains heme positive diffusely.  Skin: Warm, Dry, No erythema, No rash.   Musculoskeletal:  No CVA tenderness.   Neurologic: Alert & oriented x 3, Normal motor and sensory function, No focal deficits noted.   Psychiatric:Normal affect, Normal mood.    RADIOLOGY/PROCEDURES/Labs  Results for orders placed or performed during the hospital encounter of 09/30/18   CBC WITH DIFFERENTIAL   Result Value Ref Range    WBC 6.0 4.8 - 10.8 K/uL    RBC 4.63 (L) 4.70 - 6.10 M/uL    Hemoglobin 14.2 14.0 - 18.0 g/dL    Hematocrit 42.1 42.0 - 52.0 %    MCV 90.9 81.4 - 97.8 fL    MCH 30.7 27.0 - 33.0 pg    MCHC 33.7 33.7 - 35.3 " g/dL    RDW 42.5 35.9 - 50.0 fL    Platelet Count 265 164 - 446 K/uL    MPV 9.3 9.0 - 12.9 fL    Neutrophils-Polys 47.80 44.00 - 72.00 %    Lymphocytes 33.90 22.00 - 41.00 %    Monocytes 13.10 0.00 - 13.40 %    Eosinophils 4.00 0.00 - 6.90 %    Basophils 1.00 0.00 - 1.80 %    Immature Granulocytes 0.20 0.00 - 0.90 %    Nucleated RBC 0.00 /100 WBC    Neutrophils (Absolute) 2.85 1.82 - 7.42 K/uL    Lymphs (Absolute) 2.02 1.00 - 4.80 K/uL    Monos (Absolute) 0.78 0.00 - 0.85 K/uL    Eos (Absolute) 0.24 0.00 - 0.51 K/uL    Baso (Absolute) 0.06 0.00 - 0.12 K/uL    Immature Granulocytes (abs) 0.01 0.00 - 0.11 K/uL    NRBC (Absolute) 0.00 K/uL   COMP METABOLIC PANEL   Result Value Ref Range    Sodium 136 135 - 145 mmol/L    Potassium 4.0 3.6 - 5.5 mmol/L    Chloride 107 96 - 112 mmol/L    Co2 22 20 - 33 mmol/L    Anion Gap 7.0 0.0 - 11.9    Glucose 85 65 - 99 mg/dL    Bun 17 8 - 22 mg/dL    Creatinine 0.96 0.50 - 1.40 mg/dL    Calcium 9.5 8.5 - 10.5 mg/dL    AST(SGOT) 46 (H) 12 - 45 U/L    ALT(SGPT) 83 (H) 2 - 50 U/L    Alkaline Phosphatase 45 30 - 99 U/L    Total Bilirubin 0.4 0.1 - 1.5 mg/dL    Albumin 4.0 3.2 - 4.9 g/dL    Total Protein 7.0 6.0 - 8.2 g/dL    Globulin 3.0 1.9 - 3.5 g/dL    A-G Ratio 1.3 g/dL   PROTHROMBIN TIME (INR)   Result Value Ref Range    PT 13.9 12.0 - 14.6 sec    INR 1.05 0.87 - 1.13   APTT   Result Value Ref Range    APTT 27.9 24.7 - 36.0 sec   ESTIMATED GFR   Result Value Ref Range    GFR If African American >60 >60 mL/min/1.73 m 2    GFR If Non African American >60 >60 mL/min/1.73 m 2         COURSE & MEDICAL DECISION MAKING  Pertinent Labs & Imaging studies reviewed. (See chart for details)  Patient with ongoing back pain, has requested pain medication.  Given the chronic nature of this, there will be no prescription for narcotic pain medicine.  Patient is given prescription of Soma for muscle relaxation as he states this has helped in the past.  Patient is advised to avoid aspirin or NSAIDs  secondary to the presence of blood in his stool.  Differential diagnosis includes both upper and lower GI sources.  Patient has normal vital signs, normal hemoglobin, normal coagulation studies.  Patient is advised to establish with local primary care, referral was provided for GI medicine.  He is advised to return if worse or for any concerns.  Blood he has noticed it has been bright red, suggesting lower source.  Given history of constipation recently, suspect internal hemorrhoid.    FINAL IMPRESSION  1. Rectal bleed    2. Chronic midline low back pain with right-sided sciatica            Electronically signed by: Erik Lennon, 9/30/2018 9:13 AM

## 2018-10-04 ENCOUNTER — HOSPITAL ENCOUNTER (EMERGENCY)
Facility: MEDICAL CENTER | Age: 28
End: 2018-10-04
Attending: EMERGENCY MEDICINE
Payer: COMMERCIAL

## 2018-10-04 VITALS
RESPIRATION RATE: 16 BRPM | TEMPERATURE: 98.2 F | DIASTOLIC BLOOD PRESSURE: 77 MMHG | HEART RATE: 78 BPM | SYSTOLIC BLOOD PRESSURE: 133 MMHG | BODY MASS INDEX: 41.75 KG/M2 | HEIGHT: 73 IN | OXYGEN SATURATION: 96 % | WEIGHT: 315 LBS
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
## 2018-10-04 DIAGNOSIS — R09.81 NASAL CONGESTION: ICD-10-CM

## 2018-10-04 DIAGNOSIS — M79.10 MYALGIA: ICD-10-CM

## 2018-10-04 DIAGNOSIS — J06.9 VIRAL UPPER RESPIRATORY TRACT INFECTION WITH COUGH: ICD-10-CM

## 2018-10-04 LAB
FLUAV RNA SPEC QL NAA+PROBE: NEGATIVE
FLUBV RNA SPEC QL NAA+PROBE: NEGATIVE

## 2018-10-04 PROCEDURE — A9270 NON-COVERED ITEM OR SERVICE: HCPCS | Performed by: EMERGENCY MEDICINE

## 2018-10-04 PROCEDURE — 700102 HCHG RX REV CODE 250 W/ 637 OVERRIDE(OP): Performed by: EMERGENCY MEDICINE

## 2018-10-04 PROCEDURE — 87502 INFLUENZA DNA AMP PROBE: CPT

## 2018-10-04 PROCEDURE — 99284 EMERGENCY DEPT VISIT MOD MDM: CPT

## 2018-10-04 RX ORDER — ACETAMINOPHEN 325 MG/1
975 TABLET ORAL ONCE
Status: COMPLETED | OUTPATIENT
Start: 2018-10-04 | End: 2018-10-04

## 2018-10-04 RX ORDER — IBUPROFEN 600 MG/1
600 TABLET ORAL ONCE
Status: COMPLETED | OUTPATIENT
Start: 2018-10-04 | End: 2018-10-04

## 2018-10-04 RX ADMIN — IBUPROFEN 600 MG: 600 TABLET, FILM COATED ORAL at 14:04

## 2018-10-04 RX ADMIN — ACETAMINOPHEN 975 MG: 325 TABLET, FILM COATED ORAL at 14:03

## 2018-10-04 RX ADMIN — GUAIFENESIN 200 MG: 100 SOLUTION ORAL at 14:04

## 2018-10-04 NOTE — ED TRIAGE NOTES
Amb to triage w/ c/o cough, congestion, sore throat, body aches x 2 days.  Speaking full sentences, no distress noted.

## 2018-10-04 NOTE — DISCHARGE INSTRUCTIONS
Your blood pressure is high today.  This requires followup by a primary care doctor.  Please keep a log of your blood pressures if possible to take to your doctor appointment.  Try to increase the amount of exercise you do in your day to day living, and eliminate sodas and other sweetened beverages from your diet.

## 2018-10-04 NOTE — ED PROVIDER NOTES
"ED Provider Note    CHIEF COMPLAINT  Chief Complaint   Patient presents with   • Cough   • Congestion       HPI  Zohaib Reich is a 27 y.o. male who presents to the emergency department with 24 hours of chief complaint of nasal congestion cough and burning in his chest with cough no hemoptysis body aches and diarrhea without nausea or vomiting.  He also endorses sore throat but no ear pain and no difficulty swallowing.  Patient is at his also been ill for 3-4 days at home but he states he feels like he got hit by a freight train with how fast this hit him.  He smokes marijuana but not cigarettes was recently in a homeless shelter as of last week but now has his own apartment is endorsing some mild bug bites as well.    REVIEW OF SYSTEMS  Positives as above. Pertinent negatives include difficulty breathing nausea or vomiting rash  All other review of systems are negative    PAST MEDICAL HISTORY   has a past medical history of GERD (gastroesophageal reflux disease).    SOCIAL HISTORY  Social History     Social History Main Topics   • Smoking status: Never Smoker   • Smokeless tobacco: Never Used   • Alcohol use Yes      Comment: \"couple beers a day if I can afford it\"    • Drug use: Yes     Types: Inhaled      Comment: marijuana   • Sexual activity: Not on file       SURGICAL HISTORY  patient denies any surgical history    CURRENT MEDICATIONS  Home Medications     Reviewed by Ibrahima Alcaraz R.N. (Registered Nurse) on 10/04/18 at 1303  Med List Status: Not Addressed   Medication Last Dose Status   carisoprodol (SOMA) 350 MG Tab  Active   cyclobenzaprine (FLEXERIL) 10 MG Tab out of Active   ketorolac (TORADOL) 10 MG Tab 9/29/2018 Active   lansoprazole (PREVACID) 30 MG CAPSULE DELAYED RELEASE 9/29/2018 Active                ALLERGIES  No Known Allergies    PHYSICAL EXAM  VITAL SIGNS: /95   Pulse 89   Temp 36.4 °C (97.5 °F)   Resp 18   Ht 1.854 m (6' 1\")   Wt (!) 143 kg (315 lb 4.1 oz)   SpO2 96%   " BMI 41.59 kg/m²    Pulse ox interpretation: I interpret this pulse ox as normal.  Constitutional: Alert in no apparent distress.  HENT: Normocephalic, Atraumatic, MMM, nasal congestion bilateral TMs within normal limits, oropharynx clear uvula midline mild erythema but no tonsillar exudates no lymphadenopathy  Eyes: PERound. Conjunctiva normal, non-icteric.   Heart: Regular rate and rythm, no murmurs.    Lungs: Clear to auscultation bilaterally. No resp distress, breath sounds equal  Abdomen: Non-tender, non-distended, normal bowel sounds  Skin: Warm, Dry, No erythema, 3 small areas of erythema appears to be bug bites on the left shoulder without fluctuance or induration  Neurologic: Alert and oriented, Grossly non-focal.       DIFFERENTIAL DIAGNOSIS AND WORK UP PLAN    This is a 27 y.o. male who presents with signs symptoms history and physical examination consistent with a viral upper respiratory infection versus bronchitis is only had symptoms for 24 hours and influenza is on the differential as is we are under 24 hours or symptoms we will test him for influenza treat the patient with ibuprofen and Tylenol as he is not taking anything at home as well as guaifenesin for his cough.  Low concern for respiratory distress is not hypoxic or tachypneic his breath sounds are clear.    Pertinent Lab Findings  Labs Reviewed   INFLUENZA RAPID   NEGATIVE    Radiology  No orders to display     The radiologist's interpretation of all radiological studies have been reviewed by me.    COURSE & MEDICAL DECISION MAKING  Pertinent Labs & Imaging studies reviewed. (See chart for details)    3:07 PM  Reassess patient at the bedside, he is doing well, discussed with him negative influenza is only had the symptoms for 1 days will be sent home with decongestion Tylenol prescription, he will return to the ED for any new or worsening symptoms such as worsening difficulty breathing vomiting and concern for dehydration.  We discussed that  "fevers chills may last for 3-4 days and the cough may last up to 10 days.  He understands feels comfortable going home    /77   Pulse 78   Temp 36.8 °C (98.2 °F)   Resp 16   Ht 1.854 m (6' 1\")   Wt (!) 143 kg (315 lb 4.1 oz)   SpO2 96%   BMI 41.59 kg/m²       The patient will return for new or worsening symptoms and is stable at the time of discharge.    The patient is referred to a primary physician for blood pressure management, diabetic screening, and for all other preventative health concerns.    DISPOSITION:  Patient will be discharged home in stable condition.    FOLLOW UP:  [facility], Emergency Dept  [address] [facility] [address] [phone]  In 1 week  If symptoms worsen - difficulty breathing, cough up blood      OUTPATIENT MEDICATIONS:  New Prescriptions    PSEUDOEPHED-APAP-GUAIFENESIN -200 MG TAB    Take 1 Tab by mouth every 6 hours as needed (cough/congestion) for up to 7 days.         FINAL IMPRESSION  1. Viral upper respiratory tract infection with cough    2. Nasal congestion    3. Myalgia    4. Bug bite, initial encounter            Electronically signed by: Corinne Hung, 10/4/2018 1:38 PM    This dictation has been created using voice recognition software and/or scribes. The accuracy of the dictation is limited by the abilities of the software and the expertise of the scribes. I expect there may be some errors of grammar and possibly content. I made every attempt to manually correct the errors within my dictation. However, errors related to voice recognition software and/or scribes may still exist and should be interpreted within the appropriate context.    "

## 2018-10-04 NOTE — ED NOTES
1330- to room, agree w triage note  1345- nasal swab for infenza, med per orders  1522- discharged w 1 prescription, instructions and follow up, no questions at this time

## 2018-10-20 ENCOUNTER — HOSPITAL ENCOUNTER (EMERGENCY)
Facility: MEDICAL CENTER | Age: 28
End: 2018-10-20
Attending: EMERGENCY MEDICINE
Payer: COMMERCIAL

## 2018-10-20 VITALS
HEIGHT: 73 IN | RESPIRATION RATE: 16 BRPM | SYSTOLIC BLOOD PRESSURE: 128 MMHG | HEART RATE: 85 BPM | OXYGEN SATURATION: 99 % | WEIGHT: 315 LBS | DIASTOLIC BLOOD PRESSURE: 78 MMHG | BODY MASS INDEX: 41.75 KG/M2 | TEMPERATURE: 97.8 F

## 2018-10-20 DIAGNOSIS — R05.9 COUGH: ICD-10-CM

## 2018-10-20 PROCEDURE — 99283 EMERGENCY DEPT VISIT LOW MDM: CPT

## 2018-10-20 RX ORDER — METHYLPREDNISOLONE 4 MG/1
TABLET ORAL
Qty: 21 TAB | Refills: 0 | Status: SHIPPED | OUTPATIENT
Start: 2018-10-20

## 2018-10-20 RX ORDER — DOXYCYCLINE 100 MG/1
100 CAPSULE ORAL 2 TIMES DAILY
Qty: 20 CAP | Refills: 0 | Status: SHIPPED | OUTPATIENT
Start: 2018-10-20 | End: 2018-10-30

## 2018-10-20 ASSESSMENT — PAIN SCALES - GENERAL: PAINLEVEL_OUTOF10: 4

## 2018-10-20 NOTE — ED PROVIDER NOTES
"ED Provider Note    Scribed for Scott French M.D. by Swathi Herrera. 10/20/2018  9:07 AM    Primary care provider: No primary care provider on file.  Means of arrival: Walk-In  History obtained from: Patient  History limited by: None    CHIEF COMPLAINT  Chief Complaint   Patient presents with   • Cough   • Abdominal Pain     HPI  Zohaib Reich is a 28 y.o. male who presents to the Emergency Department complaining of a consistent productive cough onset 10/2 with associated generalized abdominal pain. Patient states this is the 6th time he has been seen this month for his cough. He states he originally came in for back pain which he is still having and then was seen later for his cough and given a couple prescriptions which he has been unable to get filled. Reports exacerbated abdominal pain with lifting his legs and sitting up. Denies ever having a cough like this in the past. Additionally, patient reports intermittent shortness of breath with his cough and bug bites with associated itchiness after staying in a Motel. Patient does confirm asthma as a kid and has not had to use an inhaler for years. Denies smoking history although does report marijuana use. Denies fever, chills, nausea, vomiting.    REVIEW OF SYSTEMS  Pertinent positives include cough, shortness of breath, sputum production, abdominal pain, back pain, bug bites, itchiness.   Pertinent negatives include no fever, chills, emesis, nausea.      PAST MEDICAL HISTORY   has a past medical history of GERD (gastroesophageal reflux disease).    SURGICAL HISTORY  patient denies any surgical history    SOCIAL HISTORY  Social History   Substance Use Topics   • Smoking status: Never Smoker   • Smokeless tobacco: Never Used   • Alcohol use Yes      Comment: \"couple beers a day if I can afford it\"       History   Drug Use   • Types: Inhaled     Comment: marijuana       FAMILY HISTORY  History reviewed. No pertinent family history.    CURRENT " "MEDICATIONS  No current facility-administered medications for this encounter.     Current Outpatient Prescriptions:   •  lansoprazole (PREVACID) 30 MG CAPSULE DELAYED RELEASE, Take 30 mg by mouth every day., Disp: , Rfl:   •  ketorolac (TORADOL) 10 MG Tab, Take 1 Tab by mouth every four hours as needed., Disp: 30 Tab, Rfl: 0  •  cyclobenzaprine (FLEXERIL) 10 MG Tab, Take 1 Tab by mouth 3 times a day as needed for Moderate Pain., Disp: 30 Tab, Rfl: 0    ALLERGIES  No Known Allergies    PHYSICAL EXAM  VITAL SIGNS: /85   Pulse 86   Temp 36.6 °C (97.8 °F) (Temporal)   Resp 18   Ht 1.854 m (6' 1\")   Wt (!) 144 kg (317 lb 7.4 oz)   SpO2 98%   BMI 41.88 kg/m²     Nursing note and vitals reviewed.  Constitutional: Well-developed and well-nourished. No distress.   HENT: Head is normocephalic and atraumatic. Oropharynx is clear and moist without exudate or erythema.   Eyes: Pupils are equal, round, and reactive to light. Conjunctiva are normal.   Cardiovascular: Normal rate and regular rhythm. No murmur heard. Normal radial pulses.  Pulmonary/Chest: Occasional dry cough. Breath sounds normal. No wheezes or rales.   Abdominal: Cannot elicit any abdominal tenderness. Soft. No distention    Musculoskeletal: Extremities exhibit normal range of motion without edema or tenderness.   Neurological: Awake, alert and oriented to person, place, and time. No focal deficits noted.  Skin: Scattered excoriations. Skin is warm and dry.   Psychiatric: Normal mood and affect. Appropriate for clinical situation.      DIAGNOSTIC STUDIES / PROCEDURES    COURSE & MEDICAL DECISION MAKING  Nursing notes, VS, PMSFHx reviewed in chart.     9:07 AM - Patient seen and examined at bedside. Plan of care was discussed which includes treating with antibiotics due to persistent cough. Patient will be discharged with Medrol and Monodox. He was given ED return precautions and discharged home. He verbalized his understanding and agrees to the " discharge instructions.     HTN/IDDM FOLLOW UP:  The patient is referred to a primary physician for blood pressure management, diabetic screening, and for all other preventive health concerns    The patient will return for new or worsening symptoms and is stable at the time of discharge.    The patient is referred to a primary physician for blood pressure management, diabetic screening, and for all other preventative health concerns.    DISPOSITION:  Patient will be discharged home in stable condition.    FOLLOW UP:  [facility], Emergency Dept  [address] [facility] [id number] [phone]        [facility] [address] [address]  [phone]  Schedule an appointment as soon as possible for a visit         OUTPATIENT MEDICATIONS:  New Prescriptions    DOXYCYCLINE (MONODOX) 100 MG CAPSULE    Take 1 Cap by mouth 2 times a day for 10 days.    METHYLPREDNISOLONE (MEDROL) 4 MG TAB    Take as per the package instructions.         FINAL IMPRESSION  1. Cough          Swathi PUENTE (Antonino), am scribing for, and in the presence of, Scott French M.D..    Electronically signed by: Swathi Herrera (Antonino), 10/20/2018    Scott PUENTE M.D. personally performed the services described in this documentation, as scribed by Swathi Herrera in my presence, and it is both accurate and complete. E    The note accurately reflects work and decisions made by me.  Scott French  10/20/2018  3:21 PM

## 2018-10-20 NOTE — ED TRIAGE NOTES
"Pt c/o cough for the last few weeks , was seen here. Given prescriptions, \" couldn't really afford to fill them all\" c/o abd pain with coughing, and c/o bug bites.   "

## 2020-06-24 NOTE — ED NOTES
..Pt verbalizes understanding of dc instructions provided. Pt states that all questions have been answered and they feel comfortable with discharge instructions provided. Pt has dc paperwork in hand. Pt has all belongings in position. Pt to lobby w/o incident.    irregular

## 2025-07-19 NOTE — ED NOTES
Pt discharged to home.  Pt given discharge paperwork and all applicable prescriptions written by provider.  Pt to follow up with PCP where indicated in discharge instructions.  Pt verbalized understanding of discharge teaching, medication instructions, and will return to ED if condition worsens.     Satisfactory